# Patient Record
Sex: FEMALE | URBAN - METROPOLITAN AREA
[De-identification: names, ages, dates, MRNs, and addresses within clinical notes are randomized per-mention and may not be internally consistent; named-entity substitution may affect disease eponyms.]

---

## 2021-10-28 ENCOUNTER — OFFICE VISIT (OUTPATIENT)
Dept: URBAN - METROPOLITAN AREA CLINIC 6 | Facility: CLINIC | Age: 61
End: 2021-10-28
Payer: COMMERCIAL

## 2021-10-28 DIAGNOSIS — H11.153 PINGUECULA, BILATERAL: ICD-10-CM

## 2021-10-28 DIAGNOSIS — H47.20 OPTIC ATROPHY: Primary | ICD-10-CM

## 2021-10-28 PROCEDURE — 92012 INTRM OPH EXAM EST PATIENT: CPT | Performed by: OPTOMETRIST

## 2021-10-28 ASSESSMENT — INTRAOCULAR PRESSURE
OS: 15
OD: 16

## 2021-10-28 NOTE — IMPRESSION/PLAN
Impression: Optic atrophy: H47.20. Plan: Optic atrophy OD>OS vs. POAG OD>OS (H40.013)-  Significant Myopic changes/peripapillary changes OU. Positive family Hx (Brother). IOP 16/15 today with Icare. Continue Latanoprost QHS OU.

## 2022-04-28 ENCOUNTER — OFFICE VISIT (OUTPATIENT)
Dept: URBAN - METROPOLITAN AREA CLINIC 6 | Facility: CLINIC | Age: 62
End: 2022-04-28
Payer: COMMERCIAL

## 2022-04-28 DIAGNOSIS — H47.20 OPTIC ATROPHY: Primary | ICD-10-CM

## 2022-04-28 DIAGNOSIS — H11.153 PINGUECULA, BILATERAL: ICD-10-CM

## 2022-04-28 DIAGNOSIS — H43.812 VITREOUS DEGENERATION, LEFT EYE: ICD-10-CM

## 2022-04-28 DIAGNOSIS — H31.092 OTHER CHORIORETINAL SCARS, LEFT EYE: ICD-10-CM

## 2022-04-28 PROCEDURE — 92014 COMPRE OPH EXAM EST PT 1/>: CPT | Performed by: OPTOMETRIST

## 2022-04-28 PROCEDURE — 92133 CPTRZD OPH DX IMG PST SGM ON: CPT | Performed by: OPTOMETRIST

## 2022-04-28 ASSESSMENT — INTRAOCULAR PRESSURE
OD: 18
OS: 17

## 2022-04-28 NOTE — IMPRESSION/PLAN
Impression: Optic atrophy: H47.20. Plan: Optic atrophy - RNFL OCT scan unreliable OU. Intraocular pressure is normal.  Plan for annual dilated examination with fundus photos. The results of testing was reviewed with the patient. The patients questions were answered and stated they understood the findings and need for regular monitoring. Unlikely to obtain accurate RNFL data in future d/t extensive PPA. RTC 6 months for HVF 24-2.

## 2022-04-28 NOTE — IMPRESSION/PLAN
Impression: Other chorioretinal scars, left eye: H31.092. Plan: Some peripheral chorioretinal scars IN OS. Stable, pigmented. Monitor.

## 2022-10-31 ENCOUNTER — OFFICE VISIT (OUTPATIENT)
Dept: URBAN - METROPOLITAN AREA CLINIC 6 | Facility: CLINIC | Age: 62
End: 2022-10-31
Payer: COMMERCIAL

## 2022-10-31 DIAGNOSIS — H47.20 OPTIC ATROPHY: Primary | ICD-10-CM

## 2022-10-31 PROCEDURE — 92083 EXTENDED VISUAL FIELD XM: CPT | Performed by: OPTOMETRIST

## 2022-10-31 PROCEDURE — 92012 INTRM OPH EXAM EST PATIENT: CPT | Performed by: OPTOMETRIST

## 2022-10-31 PROCEDURE — 92133 CPTRZD OPH DX IMG PST SGM ON: CPT | Performed by: OPTOMETRIST

## 2022-10-31 ASSESSMENT — INTRAOCULAR PRESSURE
OD: 14
OS: 14

## 2022-10-31 NOTE — IMPRESSION/PLAN
Impression: Optic atrophy: H47.20. Plan: Optic Atrophy / Glaucoma Suspect OU- RNFL OCT scan ordered today was unable to get reliable results due to missing data. Significant Myopic changes/peripapillary changes OU makes it difficult to get RNFL scans. Humphery Visual Field 24-2 ordered today and shows superior defects OD and enlarged physiological blind spot OS. Intraocular pressure is normal.  Fundus photos were ordered for next visit. The results of testing was reviewed with the patient. The patients questions were answered and stated they understood the findings and need for regular monitoring.

## 2023-03-30 ENCOUNTER — OFFICE VISIT (OUTPATIENT)
Dept: URBAN - METROPOLITAN AREA CLINIC 6 | Facility: CLINIC | Age: 63
End: 2023-03-30
Payer: MEDICARE

## 2023-03-30 DIAGNOSIS — H02.88A MEIBOMIAN GLAND DYSFUNCTION OF RIGHT EYE, UPPER AND LOWER EYELIDS: ICD-10-CM

## 2023-03-30 DIAGNOSIS — H10.45 OTHER CHRONIC ALLERGIC CONJUNCTIVITIS: ICD-10-CM

## 2023-03-30 DIAGNOSIS — H16.223 KERATOCONJUNCTIVITIS SICCA, BILATERAL: Primary | ICD-10-CM

## 2023-03-30 DIAGNOSIS — H02.88B MEIBOMIAN GLAND DYSFNCT LEFT EYE, UPPER AND LOWER EYELIDS: ICD-10-CM

## 2023-03-30 DIAGNOSIS — H11.153 PINGUECULA, BILATERAL: ICD-10-CM

## 2023-03-30 DIAGNOSIS — H47.20 OPTIC ATROPHY: ICD-10-CM

## 2023-03-30 PROCEDURE — 92012 INTRM OPH EXAM EST PATIENT: CPT | Performed by: OPTOMETRIST

## 2023-03-30 RX ORDER — CYCLOSPORINE 0.5 MG/ML
0.05 % EMULSION OPHTHALMIC
Qty: 180 | Refills: 2 | Status: ACTIVE
Start: 2023-03-30

## 2023-03-30 ASSESSMENT — INTRAOCULAR PRESSURE
OD: 12
OS: 15

## 2023-03-30 NOTE — IMPRESSION/PLAN
Impression: Meibomian gland dysfunction of right eye, upper and lower eyelids: H02.88A. Plan: See plan above.

## 2023-03-30 NOTE — IMPRESSION/PLAN
Impression: Meibomian gland dysfnct left eye, upper and lower eyelids: H02.88B. Plan: Meibomian gland dysfunction bilateral upper and lower lids - Use hot compresses and perform lid hygiene daily. Discussed with the patient benefits of flaxseed oil or omega 3 supplements. Discussed also using artificial tears.

## 2023-03-30 NOTE — IMPRESSION/PLAN
Impression: Keratoconjunctivitis sicca, bilateral: E80.660. Plan: K. Sicca- Patient unsuccessful with AT's. Plan to start Restasis BID OU.

## 2023-05-01 ENCOUNTER — OFFICE VISIT (OUTPATIENT)
Dept: URBAN - METROPOLITAN AREA CLINIC 6 | Facility: CLINIC | Age: 63
End: 2023-05-01
Payer: MEDICARE

## 2023-05-01 DIAGNOSIS — H31.092 OTHER CHORIORETINAL SCARS, LEFT EYE: ICD-10-CM

## 2023-05-01 DIAGNOSIS — H47.20 OPTIC ATROPHY: Primary | ICD-10-CM

## 2023-05-01 DIAGNOSIS — H43.812 VITREOUS DEGENERATION, LEFT EYE: ICD-10-CM

## 2023-05-01 DIAGNOSIS — H16.223 KERATOCONJUNCTIVITIS SICCA, BILATERAL: ICD-10-CM

## 2023-05-01 PROCEDURE — 92250 FUNDUS PHOTOGRAPHY W/I&R: CPT | Performed by: OPTOMETRIST

## 2023-05-01 PROCEDURE — 92014 COMPRE OPH EXAM EST PT 1/>: CPT | Performed by: OPTOMETRIST

## 2023-05-01 ASSESSMENT — INTRAOCULAR PRESSURE
OD: 14
OS: 15

## 2023-05-01 NOTE — IMPRESSION/PLAN
Impression: Keratoconjunctivitis sicca, bilateral: F04.392. Plan: K. Sicca- Patient unsuccessful with AT's. per pt will be able to start Restasis BID OU in a couple weeks.

## 2023-05-01 NOTE — IMPRESSION/PLAN
Impression: Optic atrophy: H47.20. Plan: Optic Atrophy / Glaucoma Suspect OU- Fundus photos ordered today. Significant Myopic changes/peripapillary changes OU makes it difficult to follow for glaucoma but IOPs have always been normal. Monitor without treatment for now.